# Patient Record
Sex: FEMALE | Race: WHITE | NOT HISPANIC OR LATINO | URBAN - NONMETROPOLITAN AREA
[De-identification: names, ages, dates, MRNs, and addresses within clinical notes are randomized per-mention and may not be internally consistent; named-entity substitution may affect disease eponyms.]

---

## 2023-12-11 ENCOUNTER — APPOINTMENT (OUTPATIENT)
Dept: GENERAL RADIOLOGY | Facility: HOSPITAL | Age: 68
End: 2023-12-11
Attending: NURSE PRACTITIONER
Payer: COMMERCIAL

## 2023-12-11 ENCOUNTER — HOSPITAL ENCOUNTER (EMERGENCY)
Facility: HOSPITAL | Age: 68
Discharge: HOME | End: 2023-12-11
Attending: NURSE PRACTITIONER | Admitting: NURSE PRACTITIONER
Payer: COMMERCIAL

## 2023-12-11 VITALS
TEMPERATURE: 98.8 F | HEART RATE: 87 BPM | OXYGEN SATURATION: 97 % | WEIGHT: 185 LBS | SYSTOLIC BLOOD PRESSURE: 127 MMHG | HEIGHT: 70 IN | DIASTOLIC BLOOD PRESSURE: 69 MMHG | RESPIRATION RATE: 16 BRPM | BODY MASS INDEX: 26.48 KG/M2

## 2023-12-11 DIAGNOSIS — J20.9 ACUTE BRONCHITIS WITH SYMPTOMS > 10 DAYS: Primary | ICD-10-CM

## 2023-12-11 PROCEDURE — 99213 OFFICE O/P EST LOW 20 MIN: CPT | Performed by: NURSE PRACTITIONER

## 2023-12-11 PROCEDURE — G0463 HOSPITAL OUTPT CLINIC VISIT: HCPCS | Mod: 25

## 2023-12-11 PROCEDURE — 71046 X-RAY EXAM CHEST 2 VIEWS: CPT

## 2023-12-11 RX ORDER — LOSARTAN POTASSIUM 50 MG/1
TABLET ORAL
COMMUNITY
Start: 2023-12-02

## 2023-12-11 RX ORDER — PREDNISONE 20 MG/1
TABLET ORAL
Qty: 10 TABLET | Refills: 0 | Status: SHIPPED | OUTPATIENT
Start: 2023-12-11

## 2023-12-11 ASSESSMENT — ENCOUNTER SYMPTOMS
PALPITATIONS: 0
COUGH: 1
SHORTNESS OF BREATH: 0
CHILLS: 0
FEVER: 0

## 2023-12-11 ASSESSMENT — ACTIVITIES OF DAILY LIVING (ADL): ADLS_ACUITY_SCORE: 35

## 2023-12-11 NOTE — DISCHARGE INSTRUCTIONS
Your lungs sound clear.  Your chest x-ray does not show any concerning findings.  As discussed your symptoms are consistent with bronchitis which will be treated with antibiotic and steroids I prescribed today.  Take these until they are finished.  Tylenol or ibuprofen as needed for pain.    Follow-up with your doctor in 1 week for reevaluation if your symptoms do not improve.    Return to urgent care or emergency room for any worsening or concerning symptoms.

## 2023-12-11 NOTE — ED TRIAGE NOTES
Patient presents to Urgent Care for ongoing cough for 2.5 weeks. Patient has been taking delysum without any relief. No other symptoms.

## 2023-12-11 NOTE — ED PROVIDER NOTES
"  History     Chief Complaint   Patient presents with    Cough     HPI  Tia Gloria is a 68 year old female who presents ambulatory to urgent care for evaluation of a nonproductive cough that started about 2 and half weeks ago.  Patient states that the cough is progressively worsened and is keeping her up at night.  She does report some chest heaviness that started within the last few days.  No shortness of breath.  No fevers or chills.  Former cigarette smoker.  No known history of asthma or COPD.  She has tried Delsym, Robitussin and other OTC cough syrups with minimal effectiveness.  No known recent ill contacts.  She has had COVID-19 vaccines in the past.  She has not had her influenza or and RSV vaccine.  She states she is scheduled for these in January 2024.    Allergies:  No Known Allergies    Problem List:    There are no problems to display for this patient.       Past Medical History:    No past medical history on file.    Past Surgical History:    No past surgical history on file.    Family History:    No family history on file.    Social History:  Marital Status:   [2]        Medications:    amoxicillin-clavulanate (AUGMENTIN) 875-125 MG tablet  FLUoxetine (PROZAC) 20 MG capsule  losartan (COZAAR) 50 MG tablet  predniSONE (DELTASONE) 20 MG tablet          Review of Systems   Constitutional:  Negative for chills and fever.   Respiratory:  Positive for cough. Negative for shortness of breath.    Cardiovascular:  Positive for chest pain. Negative for palpitations and leg swelling.   All other systems reviewed and are negative.      Physical Exam   BP: 127/69  Pulse: 87  Temp: 98.8  F (37.1  C)  Resp: 16  Height: 176.5 cm (5' 9.5\")  Weight: 83.9 kg (185 lb)  SpO2: 97 %      Physical Exam  Vitals and nursing note reviewed.   Constitutional:       General: She is not in acute distress.     Appearance: Normal appearance. She is well-developed. She is not diaphoretic.   HENT:      Head: Normocephalic " and atraumatic.      Right Ear: Tympanic membrane and ear canal normal.      Left Ear: Tympanic membrane and ear canal normal.      Nose: Nose normal.      Mouth/Throat:      Mouth: Mucous membranes are moist.   Eyes:      Conjunctiva/sclera: Conjunctivae normal.   Cardiovascular:      Rate and Rhythm: Normal rate and regular rhythm.      Heart sounds: Normal heart sounds. No murmur heard.     No friction rub. No gallop.   Pulmonary:      Effort: Pulmonary effort is normal. No respiratory distress.      Breath sounds: No wheezing, rhonchi or rales.   Abdominal:      General: Abdomen is flat.   Musculoskeletal:      Cervical back: Normal range of motion and neck supple.   Skin:     General: Skin is warm and dry.      Coloration: Skin is not pale.   Neurological:      Mental Status: She is alert and oriented to person, place, and time.         ED Course                 Procedures         Results for orders placed or performed during the hospital encounter of 12/11/23 (from the past 24 hour(s))   XR Chest 2 Views    Narrative    PROCEDURE:  XR CHEST 2 VIEWS    HISTORY: nonproductive cough and chest heaviness x 2.5 weeks., .    COMPARISON:  None.    FINDINGS:  The cardiomediastinal contours are normal. The trachea is midline.  There is calcific aortic atherosclerosis.  No focal consolidation, effusion or pneumothorax.    No suspicious osseous lesion or subdiaphragmatic free air.      Impression    IMPRESSION:      No discrete consolidation.      DIANE CONNELL MD         SYSTEM ID:  [id number]       Medications - No data to display    Assessments & Plan (with Medical Decision Making)   68-year-old female that presented for evaluation of a 2.5-week history of a mostly dry cough.  Progressively worsening.  Has tried OTC medications with minimal effectiveness.  Former cigarette smoker.  No history of asthma or COPD.  Her respirations are nonlabored.  Heart rate and rhythm are regular.  Oxygen saturation 97% on room air.   Faint wheeze initially heard to her left upper lungs.  This cleared after she coughed.  Chest x-ray was done due to patient complaining of some chest pain.  No acute findings appreciated on the chest x-ray per radiologist reading.  Discussed this with patient.  Her symptoms are most consistent with bronchitis which will be treated with the Augmentin and prednisone.  Recommended Tylenol or ibuprofen as needed for any pain.  Follow-up with primary doctor in 1 week for reevaluation if symptoms do not improve.  Return to urgent care or emergency room for any worsening or concerning symptoms.    I have reviewed the nursing notes.    I have reviewed the findings, diagnosis, plan and need for follow up with the patient.  This document was prepared using a combination of typing and voice generated software.  While every attempt was made for accuracy, spelling and grammatical errors may exist.         New Prescriptions    AMOXICILLIN-CLAVULANATE (AUGMENTIN) 875-125 MG TABLET    Take 1 tablet by mouth 2 times daily for 7 days    PREDNISONE (DELTASONE) 20 MG TABLET    Take two tablets (= 40mg) each day for 5 (five) days       Final diagnoses:   Acute bronchitis with symptoms > 10 days       12/11/2023   [facility]       Mpofu, Prudence, CNP  12/11/23 1012